# Patient Record
Sex: FEMALE | Race: OTHER | HISPANIC OR LATINO | URBAN - METROPOLITAN AREA
[De-identification: names, ages, dates, MRNs, and addresses within clinical notes are randomized per-mention and may not be internally consistent; named-entity substitution may affect disease eponyms.]

---

## 2023-03-23 ENCOUNTER — EMERGENCY (EMERGENCY)
Facility: HOSPITAL | Age: 28
LOS: 0 days | Discharge: HOME | End: 2023-03-23
Attending: EMERGENCY MEDICINE
Payer: MEDICAID

## 2023-03-23 ENCOUNTER — OUTPATIENT (OUTPATIENT)
Dept: OUTPATIENT SERVICES | Facility: HOSPITAL | Age: 28
LOS: 1 days | End: 2023-03-23

## 2023-03-23 VITALS
OXYGEN SATURATION: 100 % | DIASTOLIC BLOOD PRESSURE: 64 MMHG | RESPIRATION RATE: 16 BRPM | TEMPERATURE: 97 F | SYSTOLIC BLOOD PRESSURE: 130 MMHG | HEART RATE: 69 BPM

## 2023-03-23 DIAGNOSIS — K08.89 OTHER SPECIFIED DISORDERS OF TEETH AND SUPPORTING STRUCTURES: ICD-10-CM

## 2023-03-23 DIAGNOSIS — K02.9 DENTAL CARIES, UNSPECIFIED: ICD-10-CM

## 2023-03-23 PROCEDURE — D0140: CPT

## 2023-03-23 PROCEDURE — D0220: CPT

## 2023-03-23 PROCEDURE — 99284 EMERGENCY DEPT VISIT MOD MDM: CPT

## 2023-03-23 NOTE — ED ADULT NURSE NOTE - OBJECTIVE STATEMENT
Trip and fall on elevated tile this morning, c/o left elbow, shoulder, and back pain. Denies HT/ LOC/ AC use.

## 2023-03-23 NOTE — ED PROVIDER NOTE - OBJECTIVE STATEMENT
27 year-old female with no significant past medical history presents with complaint of dental pain. Patient states she has had pain in the left superior molar for 1 week, has taken 500mg amoxicillin for 1 week, as well as ibuprofen without improvement of pain. Denies fever, chills, swelling/discharge to the area, dysphagia, odynophagia, sore through, cough, shortness of breath.

## 2023-03-23 NOTE — ED PROVIDER NOTE - PHYSICAL EXAMINATION
Physical Exam    Vital Signs: I have reviewed the initial vital signs.  Constitutional: appears stated age, no acute distress  Eyes: Conjunctiva pink, Sclera clear, PERRLA, EOMI.  ENT: OP is clear without exudates, normal dentition, tenderness to palpation and mild swelling to tooth # 16, without surrounding erythema or swelling  Respiratory: unlabored respiratory effort, clear to auscultation bilaterally no wheezing, rales and rhonchi  Musculoskeletal: supple neck  Integumentary: warm, dry, no rash  Neurologic: awake, alert, oriented x3  Psychiatric: appropriate mood, appropriate affect

## 2023-03-23 NOTE — ED ADULT NURSE NOTE - DRUG PRE-SCREENING (DAST -1)
Oriented to time, place, person, situation Oriented to time, place, person, situation Oriented to time, place, person, situation Statement Selected Oriented to time, place, person, situation Oriented to time, place, person, situation Oriented to time, place, person, situation

## 2023-03-23 NOTE — ED ADULT TRIAGE NOTE - SPO2 (%)
100 Erythromycin Counseling:  I discussed with the patient the risks of erythromycin including but not limited to GI upset, allergic reaction, drug rash, diarrhea, increase in liver enzymes, and yeast infections.

## 2023-03-23 NOTE — CONSULT NOTE ADULT - SUBJECTIVE AND OBJECTIVE BOX
Patient is a 27y old  Female who presents with a chief complaint of  dental pain upper left    HPI:   Patient had been in pain for past week. Patient was prescribed pain med and antibiotic.    PAST MEDICAL & SURGICAL HISTORY:  No pertinent past medical history        ( -  ) heart valve replacement  ( - ) joint replacement  ( -  ) pregnancy    MEDICATIONS  (STANDING):    MEDICATIONS  (PRN):      Allergies      Intolerances        FAMILY HISTORY:      *Last Dental Visit: unknown    Vital Signs Last 24 Hrs  T(C): 36.1 (23 Mar 2023 09:18), Max: 36.1 (23 Mar 2023 09:18)  T(F): 97 (23 Mar 2023 09:18), Max: 97 (23 Mar 2023 09:18)  HR: 69 (23 Mar 2023 09:18) (69 - 69)  BP: 130/64 (23 Mar 2023 09:18) (130/64 - 130/64)  BP(mean): --  RR: 16 (23 Mar 2023 09:18) (16 - 16)  SpO2: 100% (23 Mar 2023 09:18) (100% - 100%)    Parameters below as of 23 Mar 2023 09:18  Patient On (Oxygen Delivery Method): room air        LABS:                  EOE:  TMJ (  - ) clicks                     ( -  ) pops                     (  - ) crepitus             Mandible <<FROM>>             Facial bones and MOM <<grossly intact>>             ( -  ) trismus             ( -  ) lymphadenopathy             ( -  ) swelling             ( -  ) asymmetry             ( -  ) palpation             ( -  ) dyspnea             ( -  ) dysphagia             ( -  ) loss of consciousness  EOE WNL  IOE:  <<permanent>> dentition:  <<multiple carious teeth>>            hard/soft palate:  (  - ) palatal torus, <<No pathology noted>>           tongue/FOM <<No pathology noted>>           labial/buccal mucosa <<No pathology noted>>           ( +  ) percussion           ( + ) palpation           ( -  ) swelling            (  - ) abscess           ( -  ) sinus tract  IOE, #16 caries noted, tender on palpation or percussion. no swelling or infection noted.  Dentition present: << y  >>  Mobility: <<n  >>  Caries: << y  >>       *DENTAL RADIOGRAPHS: 1 Periapical , #16 gross caries    RADIOLOGY & ADDITIONAL STUDIES:    *ASSESSMENT:       *PLAN: Recommend patient to obtain proper referral from primary dentist to schedule appointment for OS. Patient understands and agrees. Instructed patient to complete previously prescribed medication.      RECOMMENDATIONS:  1) Complete medication as prescribed  2) Dental F/U with outpatient dentist for comprehensive dental care.   3) If any difficulty swallowing/breathing, fever occur, return to ER.     Agustin Ponce DDS, [id number]

## 2023-03-23 NOTE — ED PROVIDER NOTE - NS ED ATTENDING STATEMENT MOD
This was a shared visit with the JACQUELINE. I reviewed and verified the documentation and independently performed the documented:

## 2023-05-17 DIAGNOSIS — K08.9 DISORDER OF TEETH AND SUPPORTING STRUCTURES, UNSPECIFIED: ICD-10-CM

## 2023-08-11 ENCOUNTER — OUTPATIENT (OUTPATIENT)
Dept: OUTPATIENT SERVICES | Facility: HOSPITAL | Age: 28
LOS: 1 days | End: 2023-08-11

## 2023-08-11 DIAGNOSIS — Z01.20 ENCOUNTER FOR DENTAL EXAMINATION AND CLEANING WITHOUT ABNORMAL FINDINGS: ICD-10-CM

## 2023-08-11 DIAGNOSIS — Z01.21 ENCOUNTER FOR DENTAL EXAMINATION AND CLEANING WITH ABNORMAL FINDINGS: ICD-10-CM

## 2023-08-11 PROBLEM — Z78.9 OTHER SPECIFIED HEALTH STATUS: Chronic | Status: ACTIVE | Noted: 2023-03-23

## 2023-08-11 PROCEDURE — D0230: CPT

## 2023-08-11 PROCEDURE — D0150: CPT

## 2023-08-11 PROCEDURE — D0330: CPT

## 2023-08-11 PROCEDURE — D1110: CPT

## 2024-01-25 ENCOUNTER — EMERGENCY (EMERGENCY)
Facility: HOSPITAL | Age: 29
LOS: 0 days | Discharge: HOME | End: 2024-01-25
Attending: EMERGENCY MEDICINE
Payer: MEDICAID

## 2024-01-25 VITALS
WEIGHT: 160.94 LBS | HEIGHT: 64.96 IN | OXYGEN SATURATION: 99 % | SYSTOLIC BLOOD PRESSURE: 148 MMHG | HEART RATE: 73 BPM | DIASTOLIC BLOOD PRESSURE: 91 MMHG | TEMPERATURE: 98 F | RESPIRATION RATE: 17 BRPM

## 2024-01-25 DIAGNOSIS — H92.01 OTALGIA, RIGHT EAR: ICD-10-CM

## 2024-01-25 DIAGNOSIS — H73.891 OTHER SPECIFIED DISORDERS OF TYMPANIC MEMBRANE, RIGHT EAR: ICD-10-CM

## 2024-01-25 PROCEDURE — 99284 EMERGENCY DEPT VISIT MOD MDM: CPT

## 2024-01-25 PROCEDURE — 99283 EMERGENCY DEPT VISIT LOW MDM: CPT | Mod: 25

## 2024-01-25 PROCEDURE — 96372 THER/PROPH/DIAG INJ SC/IM: CPT

## 2024-01-25 RX ORDER — ACETAMINOPHEN 500 MG
650 TABLET ORAL ONCE
Refills: 0 | Status: COMPLETED | OUTPATIENT
Start: 2024-01-25 | End: 2024-01-25

## 2024-01-25 RX ORDER — KETOROLAC TROMETHAMINE 30 MG/ML
30 SYRINGE (ML) INJECTION ONCE
Refills: 0 | Status: DISCONTINUED | OUTPATIENT
Start: 2024-01-25 | End: 2024-01-25

## 2024-01-25 RX ADMIN — Medication 650 MILLIGRAM(S): at 03:54

## 2024-01-25 RX ADMIN — Medication 30 MILLIGRAM(S): at 03:57

## 2024-01-25 NOTE — ED PROVIDER NOTE - NSFOLLOWUPINSTRUCTIONS_ED_ALL_ED_FT
Nuestros coordinadores de referencias del departamento de emergencias se comunicarán con usted en las próximas 24 a  48 horas de 9:00 a. m. a 5:00 p. m. (de lunes a viernes) con lianet perfecto de seguimiento. Espere lianet llamada telefónica del hospital en brayan período de tiempo. Si no recibe lianet llamada o si tiene alguna pregunta o inquietud, puede comunicarse con alejandro donald [phone].     Earache    WHAT YOU NEED TO KNOW:    An earache can be caused by a problem within your ear or from another body area. Common causes include earwax buildup, objects in your ear, injury, infections, or jaw or dental problems. Less often, earaches may be caused by arthritis in your upper spine.    DISCHARGE INSTRUCTIONS:    Return to the emergency department if:     You have a severe earache.      You have ear pain with itching, hearing loss, dizziness, a feeling of fullness in your ear, or ringing in your ears.    Contact your healthcare provider if:     Your ear pain worsens or does not go away with treatment.      You have drainage from your ear.      You have a fever.       Your outer ear becomes red, swollen, and warm.      You have questions or concerns about your condition or care.    Medicines: You may need any of the following:     NSAIDs, such as ibuprofen, help decrease swelling, pain, and fever. This medicine is available with or without a doctor's order. NSAIDs can cause stomach bleeding or kidney problems in certain people. If you take blood thinner medicine, always ask if NSAIDs are safe for you. Always read the medicine label and follow directions. Do not give these medicines to children under 6 months of age without direction from your child's healthcare provider.      Acetaminophen decreases pain and fever. It is available without a doctor's order. Ask how much to take and how often to take it. Follow directions. Acetaminophen can cause liver damage if not taken correctly.      Do not give aspirin to children under 18 years of age. Your child could develop Reye syndrome if he takes aspirin. Reye syndrome can cause life-threatening brain and liver damage. Check your child's medicine labels for aspirin, salicylates, or oil of wintergreen.       Take your medicine as directed. Call your healthcare provider if you think your medicine is not helping or if you have side effects. Tell him if you are allergic to any medicine. Keep a list of the medicines, vitamins, and herbs you take. Include the amounts, and when and why you take them. Bring the list or the pill bottles to follow-up visits. Carry your medicine list with you in case of an emergency.    Follow up with your healthcare provider as directed: Write down your questions so you remember to ask them during your visits.       © Copyright [employer] 2019 All illustrations and images included in CareNotes are the copyrighted property of [employer] or [employer].

## 2024-01-25 NOTE — ED PROVIDER NOTE - ATTENDING APP SHARED VISIT CONTRIBUTION OF CARE
27 yo F with no significant PMHx presents to the ED c/o persisting R ear pain x 1 week. Pt saw PMD at symptom onset, says she cleaned out her ear, started her on PO/otic drop antibiotics but pt has had no relief. She noted that the day after she saw her she had small amount of bleeding from ear. She has also been taking Motrin without any relief of pain. She denies other complaints. Pt denies fever, chills, nausea, vomiting, abdominal pain, diarrhea, headache, dizziness, weakness, chest pain, SOB, back pain, LOC, trauma, urinary symptoms, cough, calf pain/swelling, recent travel, recent surgery.      I have personally seen and examined this patient.  I have fully participated in the care of this patient. I   have reviewed all pertinent clinical information, including history, physical exam, plan and the PA's note and agree except as noted    pt in nad, ncat, perrl, eomi, neck sup, no lad, mastoid nt, pinna/auricle nml, canal nml. blood on R TM, no obvs perf, tmj and teeth unremarkable. jaw nml.  pt is already on abx.

## 2024-01-25 NOTE — ED PROVIDER NOTE - PATIENT PORTAL LINK FT
You can access the FollowMyHealth Patient Portal offered by [facility] by registering at the following website: [email or web address]. By joining [facility] FollowMyHealth portal, you will also be able to view your health information using other applications (apps) compatible with our system.

## 2024-01-25 NOTE — ED PROVIDER NOTE - PHYSICAL EXAMINATION
VITAL SIGNS: I have reviewed nursing notes and confirm.  CONSTITUTIONAL: Well-developed; well-nourished; in no acute distress.  SKIN: Skin exam is warm and dry, no acute rash.  HEAD: Normocephalic; atraumatic.  EYES: PERRL, EOM intact; conjunctiva and sclera clear.  ENT: No nasal discharge; airway clear. L TM clear. R TM erythematous with small amount of blood, canal clear. No FB. No mastoid TTP. Oropharynx clear.   NECK: Supple; non tender.  CARD: S1, S2 normal; no murmurs, gallops, or rubs. Regular rate and rhythm.  RESP: No wheezes, rales or rhonchi. Speaking in full sentences.   EXT: Normal ROM. No clubbing, cyanosis or edema.  NEURO: Alert, oriented. Grossly unremarkable. No focal deficits.

## 2024-01-25 NOTE — ED PROVIDER NOTE - NSPTACCESSSVCSAPPTDETAILS_ED_ALL_ED_FT
Pt with persisting R ear pain x 1 week, on abx not improving. Needs ENT follow-up soon for further eval.   Please call with .

## 2024-01-25 NOTE — ED PROVIDER NOTE - OBJECTIVE STATEMENT
29 yo F with no significant PMHx presents to the ED c/o persisting R ear pain x 1 week. Pt saw PMD at symptom onset, says she cleaned out her ear, started her on PO/otic drop antibiotics but pt has had no relief. She noted that the day after she saw her she had small amount of bleeding from ear. She has also been taking Motrin without any relief of pain. She denies other complaints. Pt denies fever, chills, nausea, vomiting, abdominal pain, diarrhea, headache, dizziness, weakness, chest pain, SOB, back pain, LOC, trauma, urinary symptoms, cough, calf pain/swelling, recent travel, recent surgery.

## 2024-01-25 NOTE — ED PROVIDER NOTE - CARE PROVIDER_API CALL
Jensen Reyes  Otolaryngology  [address] [place] [id number]  Phone: [phone]  Fax: [phone]  Follow Up Time: 1-3 Days

## 2024-03-01 ENCOUNTER — APPOINTMENT (OUTPATIENT)
Dept: OTOLARYNGOLOGY | Facility: CLINIC | Age: 29
End: 2024-03-01
Payer: MEDICAID

## 2024-03-01 PROBLEM — Z00.00 ENCOUNTER FOR PREVENTIVE HEALTH EXAMINATION: Status: ACTIVE | Noted: 2024-03-01

## 2024-03-01 PROCEDURE — 69210 REMOVE IMPACTED EAR WAX UNI: CPT

## 2024-03-01 RX ORDER — ACETIC ACID 20 MG/ML
2 SOLUTION AURICULAR (OTIC)
Qty: 1 | Refills: 0 | Status: ACTIVE | COMMUNITY
Start: 2024-03-01 | End: 1900-01-01

## 2024-03-01 NOTE — HISTORY OF PRESENT ILLNESS
[de-identified] : Samo[place] id# [id number] Patient presents today for c/o ear pain. She states she was in the ER and was discharged with the diagnosis of Otalgia of the right ear. She has itchiness in the ear and was told her ear drum burst. She is still having itchiness in the ear and feels as if she has a wax build up. She had originally received antibiotic injections. Her hearing is unaffected. No further complaints.

## 2024-03-01 NOTE — PHYSICAL EXAM
[Normal] : temporomandibular joint is normal [de-identified] : right EAC white deposits and wax  debrided with microsuction.

## 2024-03-03 LAB — BACTERIA SPEC CULT: ABNORMAL

## 2024-05-01 ENCOUNTER — APPOINTMENT (OUTPATIENT)
Dept: OTOLARYNGOLOGY | Facility: CLINIC | Age: 29
End: 2024-05-01

## 2024-06-28 ENCOUNTER — APPOINTMENT (OUTPATIENT)
Dept: OTOLARYNGOLOGY | Facility: CLINIC | Age: 29
End: 2024-06-28
Payer: MEDICAID

## 2024-06-28 DIAGNOSIS — H92.01 OTALGIA, RIGHT EAR: ICD-10-CM

## 2024-06-28 DIAGNOSIS — H62.40 SUPERFICIAL MYCOSIS, UNSPECIFIED: ICD-10-CM

## 2024-06-28 DIAGNOSIS — L29.9 PRURITUS, UNSPECIFIED: ICD-10-CM

## 2024-06-28 DIAGNOSIS — B36.9 SUPERFICIAL MYCOSIS, UNSPECIFIED: ICD-10-CM

## 2024-06-28 PROCEDURE — 99214 OFFICE O/P EST MOD 30 MIN: CPT

## 2025-03-21 ENCOUNTER — APPOINTMENT (OUTPATIENT)
Dept: NEUROLOGY | Facility: CLINIC | Age: 30
End: 2025-03-21
Payer: MEDICAID

## 2025-03-21 VITALS — DIASTOLIC BLOOD PRESSURE: 88 MMHG | SYSTOLIC BLOOD PRESSURE: 135 MMHG | HEART RATE: 86 BPM

## 2025-03-21 VITALS — HEIGHT: 64.96 IN | WEIGHT: 170 LBS | BODY MASS INDEX: 28.32 KG/M2

## 2025-03-21 DIAGNOSIS — M26.621 ARTHRALGIA OF RIGHT TEMPOROMANDIBULAR JOINT: ICD-10-CM

## 2025-03-21 PROCEDURE — 99203 OFFICE O/P NEW LOW 30 MIN: CPT
